# Patient Record
Sex: MALE | Race: OTHER | HISPANIC OR LATINO | URBAN - METROPOLITAN AREA
[De-identification: names, ages, dates, MRNs, and addresses within clinical notes are randomized per-mention and may not be internally consistent; named-entity substitution may affect disease eponyms.]

---

## 2020-12-27 ENCOUNTER — EMERGENCY (EMERGENCY)
Facility: HOSPITAL | Age: 55
LOS: 1 days | Discharge: HOME | End: 2020-12-27
Attending: STUDENT IN AN ORGANIZED HEALTH CARE EDUCATION/TRAINING PROGRAM | Admitting: STUDENT IN AN ORGANIZED HEALTH CARE EDUCATION/TRAINING PROGRAM
Payer: COMMERCIAL

## 2020-12-27 VITALS
TEMPERATURE: 101 F | RESPIRATION RATE: 26 BRPM | SYSTOLIC BLOOD PRESSURE: 132 MMHG | OXYGEN SATURATION: 96 % | DIASTOLIC BLOOD PRESSURE: 77 MMHG | HEART RATE: 119 BPM

## 2020-12-27 VITALS
SYSTOLIC BLOOD PRESSURE: 127 MMHG | HEART RATE: 87 BPM | OXYGEN SATURATION: 95 % | TEMPERATURE: 100 F | RESPIRATION RATE: 19 BRPM | DIASTOLIC BLOOD PRESSURE: 83 MMHG

## 2020-12-27 LAB
B PERT DNA SPEC QL NAA+PROBE: SIGNIFICANT CHANGE UP
C PNEUM DNA SPEC QL NAA+PROBE: SIGNIFICANT CHANGE UP
FLUAV H1 2009 PAND RNA SPEC QL NAA+PROBE: SIGNIFICANT CHANGE UP
FLUAV H1 RNA SPEC QL NAA+PROBE: SIGNIFICANT CHANGE UP
FLUAV H3 RNA SPEC QL NAA+PROBE: SIGNIFICANT CHANGE UP
FLUAV SUBTYP SPEC NAA+PROBE: SIGNIFICANT CHANGE UP
FLUBV RNA SPEC QL NAA+PROBE: SIGNIFICANT CHANGE UP
HADV DNA SPEC QL NAA+PROBE: SIGNIFICANT CHANGE UP
HCOV PNL SPEC NAA+PROBE: SIGNIFICANT CHANGE UP
HMPV RNA SPEC QL NAA+PROBE: SIGNIFICANT CHANGE UP
HPIV1 RNA SPEC QL NAA+PROBE: SIGNIFICANT CHANGE UP
HPIV2 RNA SPEC QL NAA+PROBE: SIGNIFICANT CHANGE UP
HPIV3 RNA SPEC QL NAA+PROBE: SIGNIFICANT CHANGE UP
HPIV4 RNA SPEC QL NAA+PROBE: SIGNIFICANT CHANGE UP
RAPID RVP RESULT: DETECTED
RV+EV RNA SPEC QL NAA+PROBE: SIGNIFICANT CHANGE UP
SARS-COV-2 RNA SPEC QL NAA+PROBE: DETECTED

## 2020-12-27 PROCEDURE — 71045 X-RAY EXAM CHEST 1 VIEW: CPT | Mod: 26

## 2020-12-27 PROCEDURE — 99284 EMERGENCY DEPT VISIT MOD MDM: CPT | Mod: 25

## 2020-12-27 PROCEDURE — 93010 ELECTROCARDIOGRAM REPORT: CPT

## 2020-12-27 RX ORDER — IBUPROFEN 200 MG
400 TABLET ORAL ONCE
Refills: 0 | Status: COMPLETED | OUTPATIENT
Start: 2020-12-27 | End: 2020-12-27

## 2020-12-27 RX ADMIN — Medication 400 MILLIGRAM(S): at 18:06

## 2020-12-27 RX ADMIN — Medication 100 MILLIGRAM(S): at 18:06

## 2020-12-27 NOTE — ED ADULT NURSE NOTE - OBJECTIVE STATEMENT
pt received in rm 6 AAO x 3. pt reports body aches, fevers (102 at home), dry cough and sob x 5 days. pt admits to being + covid 6 days ago, family members also positive for covid. pt denies chest pain, n/v/d. respirations even and unlabored. awaiting x-ray at this time. will continue ot monitor.

## 2020-12-27 NOTE — ED ADULT TRIAGE NOTE - CHIEF COMPLAINT QUOTE
states" I am having fever of 102 with SOB body pains at home since 5 days" admits to having COVID positive family member at home. last Tylenol 2 hours ago.

## 2020-12-27 NOTE — ED PROVIDER NOTE - NS ED ROS FT
Gen: +fever, chills  CV: Denies chest pain  Skin: Denies rash, erythema  Resp: +SOB, cough  ENT: Denies nasal congestion  Eyes: Denies blurry vision  GI: Denies nausea, vomiting, diarrhea  Msk: Denies LE swelling  : Denies dysuria  Neuro: Denies headache

## 2020-12-27 NOTE — ED PROVIDER NOTE - OBJECTIVE STATEMENT
54 yo M with no pmhx presents with 5ds of fever, body aches, shortness of breath with exertion, dry cough. Family member positive for covid. Took tylenol 2 hours ago. Denies chest pain, abd pain, nausea, vomiting, diarrhea. Tolerating PO. Does not have a primary care physician. Denies smoking cigarettes.

## 2020-12-27 NOTE — ED ADULT NURSE NOTE - NSIMPLEMENTINTERV_GEN_ALL_ED
Implemented All Universal Safety Interventions:  [place] to call system. Call bell, personal items and telephone within reach. Instruct patient to call for assistance. Room bathroom lighting operational. Non-slip footwear when patient is off stretcher. Physically safe environment: no spills, clutter or unnecessary equipment. Stretcher in lowest position, wheels locked, appropriate side rails in place.

## 2020-12-27 NOTE — ED PROVIDER NOTE - NSFOLLOWUPINSTRUCTIONS_ED_ALL_ED_FT
You likely have coronavirus infection - please see attached instructions.     For pain or fever you can ibuprofen (motrin, advil) or tylenol as needed, as directed on packaging.     You can use 500-1000mg Tylenol every 6 hours for pain - as needed.  This is an over-the-counter medications - please respect the warnings on the label. This medication come with certain risks and side effects that you need to discuss with your doctor, especially if you are taking it for a prolonged period.  You can use 400-600mg Ibuprofen (such as motrin or advil) every 6 to 8 hours as needed for pain control.  Take ibuprofen with food or milk to lessen stomach upset.  This is an over-the-counter medication please respect the warnings on the label. All medications come with certain risks and side effects that you need to discuss with your doctor, especially if you are taking them for a prolonged period.    If needed, call patient access services at [phone] to find a primary care doctor, or call at [phone] to make an appointment at the clinic.

## 2020-12-27 NOTE — ED PROVIDER NOTE - PATIENT PORTAL LINK FT
You can access the FollowMyHealth Patient Portal offered by [facility] by registering at the following website: [email or web address]. By joining [employer]’s FollowMyHealth portal, you will also be able to view your health information using other applications (apps) compatible with our system.

## 2020-12-27 NOTE — ED PROVIDER NOTE - PHYSICAL EXAMINATION
Gen: well developed and well nourished, NAD  Head: NC/NT  Eyes: PERRL  ENT: airway patent, mmm   CV: tachy, +S1/S2  Resp: CTAB, symmetric breath sounds  Sating 96-98% on RA, ambulatory sat of 92% - that improves immediately after sitting down  GI:  abdomen soft non-distended, NTTP  Extremities -  no edema  Neuro: A&Ox3, following commands, speech clear, moving all four extremities spontaneously  Psych: appropriate mood, normal insight

## 2020-12-27 NOTE — ED PROVIDER NOTE - ATTENDING CONTRIBUTION TO CARE
55M with no pmh presenting with fever, myalgias, sob, dry cough, diarrhea x 5 days with sick contact with family member who was COVID+. Patient states has been taking tylenol for symptoms. Denies chills, cp, nausea, vomiting, headache, dizziness, dysuria    GEN: NAD, awake, well appearing  HEENT: NCAT, MMM, normal conjunctiva, perrl  CHEST/LUNGS: Non-tachypneic, CTAB, bilateral breath sounds  CARDIAC: tachycardic, s1s2, normal perfusion, no peripheral edema  ABDOMEN: Soft, NTND, No rebound/guarding  MSK: No joint tenderness, no gross deformity of extremities  SKIN: No rashes, no petechiae, no vesicles  NEURO: CN grossly intact, normal coordination, no focal motor or sensory deficits  PSYCH: Alert, appropriate, cooperative     Patient presenting with likely viral syndrome, possible COVID given exposure. Well appearing, benign exam. To obtain screening cxr otherwise no further workup indicated at this time.

## 2020-12-29 NOTE — ED POST DISCHARGE NOTE - REASON FOR FOLLOW-UP
Other COVID-19 : detected. Patient contact # [phone] patient already aware of pos COVID-19 results prior to ED visit. Patient aware of COVID-19 precautions.

## 2023-01-17 NOTE — ED ADULT NURSE NOTE - NS ED NOTE  TALK SOMEONE YN
Chief Complaint   Patient presents with     RECHECK       Blood pressure (!) 167/96, pulse 87, height 1.829 m (6'), weight 86.6 kg (191 lb).    Trina Garcia, EMT    
No
